# Patient Record
Sex: MALE | Race: OTHER
[De-identification: names, ages, dates, MRNs, and addresses within clinical notes are randomized per-mention and may not be internally consistent; named-entity substitution may affect disease eponyms.]

---

## 2018-04-25 ENCOUNTER — HOSPITAL ENCOUNTER (OUTPATIENT)
Dept: HOSPITAL 88 - MRI | Age: 58
End: 2018-04-25
Attending: INTERNAL MEDICINE
Payer: COMMERCIAL

## 2018-04-25 DIAGNOSIS — I25.10: ICD-10-CM

## 2018-04-25 DIAGNOSIS — I63.9: Primary | ICD-10-CM

## 2018-04-25 PROCEDURE — 70551 MRI BRAIN STEM W/O DYE: CPT

## 2018-04-26 NOTE — DIAGNOSTIC IMAGING REPORT
Exam: Brain MRI without IV contrast

History: CVA

Comparison studies: None



Technique: 

Sagittal axial T2, axial DWI, axial T1 FLAIR, axial T2*GRE and axial coronal T2

FLAIR.



Intravenous contrast: None



Findings:



Scalp: Normal in signal. No masses.

Bone marrow: Normal in signal intensity.



Brain sulci: Appropriate for age.

Ventricles: Normal in size. No hydrocephalus.

Extra axial spaces: No mass, no fluid collection.



Parenchyma:

No mass, hemorrhage or acute ischemia. There are small chronic lacunar infarcts

in the left frontal and left perirolandic centrum semiovale along the left

internal MCA watershed border zone. Additional chronic lacunar infarct in the

left ventral thalamic capsular junction. A few scattered T2 FLAIR hyperintense

foci in the supratentorial white matter are nonspecific but most compatible

with chronic small vessel ischemic changes.



Suprasellar region: No abnormalities.

Craniocervical junction: Patent foramen magnum.  No Chiari malformation.

Vessels: Normal flow-voids in the arteries and sinuses. Incidental findings:

Small nonobstructing retention cyst in the right maxillary sinus.



IMPRESSION:



1.  Small left ventral thalamocapsular lacunar infarct with additional small

chronic lacunar infarcts in the left centrum semiovale along the left internal

MCA watershed border zone.



2.  Mild supratentorial chronic microvascular ischemic changes.





Recommendation: Cervical and intracranial CTA or MRA to to exclude the

possibility of significant left ICA or MCA disease.



Signed by: Dr. Pelon Montalvo M.D. on 4/26/2018 7:14 AM